# Patient Record
Sex: FEMALE | Employment: OTHER | URBAN - METROPOLITAN AREA
[De-identification: names, ages, dates, MRNs, and addresses within clinical notes are randomized per-mention and may not be internally consistent; named-entity substitution may affect disease eponyms.]

---

## 2019-03-21 ENCOUNTER — OFFICE VISIT (OUTPATIENT)
Dept: UROGYNECOLOGY | Facility: CLINIC | Age: 64
End: 2019-03-21
Payer: MEDICARE

## 2019-03-21 VITALS
DIASTOLIC BLOOD PRESSURE: 84 MMHG | SYSTOLIC BLOOD PRESSURE: 156 MMHG | BODY MASS INDEX: 26.74 KG/M2 | HEIGHT: 62 IN | WEIGHT: 145.31 LBS

## 2019-03-21 DIAGNOSIS — N95.2 VAGINAL ATROPHY: Primary | ICD-10-CM

## 2019-03-21 DIAGNOSIS — K58.9 IRRITABLE BOWEL SYNDROME, UNSPECIFIED TYPE: ICD-10-CM

## 2019-03-21 DIAGNOSIS — N39.46 URINARY INCONTINENCE, MIXED: ICD-10-CM

## 2019-03-21 DIAGNOSIS — N36.2 URETHRAL CARUNCLE: ICD-10-CM

## 2019-03-21 PROCEDURE — 99204 OFFICE O/P NEW MOD 45 MIN: CPT | Mod: 25,S$PBB,, | Performed by: OBSTETRICS & GYNECOLOGY

## 2019-03-21 PROCEDURE — 99999 PR PBB SHADOW E&M-NEW PATIENT-LVL III: ICD-10-PCS | Mod: PBBFAC,,, | Performed by: OBSTETRICS & GYNECOLOGY

## 2019-03-21 PROCEDURE — 99204 PR OFFICE/OUTPT VISIT, NEW, LEVL IV, 45-59 MIN: ICD-10-PCS | Mod: 25,S$PBB,, | Performed by: OBSTETRICS & GYNECOLOGY

## 2019-03-21 PROCEDURE — 99999 PR PBB SHADOW E&M-NEW PATIENT-LVL III: CPT | Mod: PBBFAC,,, | Performed by: OBSTETRICS & GYNECOLOGY

## 2019-03-21 PROCEDURE — 87086 URINE CULTURE/COLONY COUNT: CPT

## 2019-03-21 PROCEDURE — 51701 INSERT BLADDER CATHETER: CPT | Mod: PBBFAC | Performed by: OBSTETRICS & GYNECOLOGY

## 2019-03-21 PROCEDURE — 51701 PR INSERTION OF NON-INDWELLING BLADDER CATHETERIZATION FOR RESIDUAL UR: ICD-10-PCS | Mod: S$PBB,,, | Performed by: OBSTETRICS & GYNECOLOGY

## 2019-03-21 PROCEDURE — 51701 INSERT BLADDER CATHETER: CPT | Mod: S$PBB,,, | Performed by: OBSTETRICS & GYNECOLOGY

## 2019-03-21 PROCEDURE — 99203 OFFICE O/P NEW LOW 30 MIN: CPT | Mod: PBBFAC,25 | Performed by: OBSTETRICS & GYNECOLOGY

## 2019-03-21 RX ORDER — LISINOPRIL 10 MG/1
TABLET ORAL
COMMUNITY
End: 2020-07-22

## 2019-03-21 RX ORDER — ATORVASTATIN CALCIUM 40 MG/1
TABLET, FILM COATED ORAL
COMMUNITY
Start: 2019-01-20 | End: 2019-05-08 | Stop reason: SDUPTHER

## 2019-03-21 RX ORDER — ESTRADIOL 2 MG/1
SYSTEM VAGINAL
COMMUNITY
Start: 2019-02-19 | End: 2021-07-22 | Stop reason: SDUPTHER

## 2019-03-21 RX ORDER — METHYLPREDNISOLONE 4 MG/1
TABLET ORAL
COMMUNITY
Start: 2019-01-08 | End: 2019-07-18

## 2019-03-21 RX ORDER — ACETAMINOPHEN 500 MG
TABLET ORAL
COMMUNITY

## 2019-03-21 RX ORDER — NEBIVOLOL 5 MG/1
TABLET ORAL
COMMUNITY
End: 2019-07-18

## 2019-03-21 RX ORDER — ALBUTEROL SULFATE 90 UG/1
AEROSOL, METERED RESPIRATORY (INHALATION)
COMMUNITY

## 2019-03-21 RX ORDER — LISINOPRIL 10 MG/1
TABLET ORAL
COMMUNITY
Start: 2019-01-19 | End: 2020-07-22

## 2019-03-21 RX ORDER — ATORVASTATIN CALCIUM 40 MG/1
TABLET, FILM COATED ORAL
COMMUNITY

## 2019-03-21 RX ORDER — FLUTICASONE PROPIONATE 50 MCG
SPRAY, SUSPENSION (ML) NASAL
COMMUNITY
Start: 2019-02-26

## 2019-03-21 RX ORDER — BENZONATATE 200 MG/1
CAPSULE ORAL
COMMUNITY
Start: 2019-02-26

## 2019-03-21 RX ORDER — ALBUTEROL SULFATE 90 UG/1
AEROSOL, METERED RESPIRATORY (INHALATION)
COMMUNITY
Start: 2019-02-26 | End: 2019-05-08 | Stop reason: SDUPTHER

## 2019-03-21 RX ORDER — ASPIRIN 81 MG/1
TABLET ORAL
COMMUNITY

## 2019-03-21 RX ORDER — PANTOPRAZOLE SODIUM 40 MG/1
TABLET, DELAYED RELEASE ORAL
COMMUNITY
Start: 2019-03-01

## 2019-03-21 RX ORDER — CHLORDIAZEPOXIDE HYDROCHLORIDE AND CLIDINIUM BROMIDE 5; 2.5 MG/1; MG/1
CAPSULE ORAL
COMMUNITY
Start: 2019-02-15 | End: 2020-07-22

## 2019-03-21 RX ORDER — CETIRIZINE HYDROCHLORIDE 10 MG/1
TABLET ORAL
COMMUNITY

## 2019-03-21 RX ORDER — ESTRADIOL 0.1 MG/G
CREAM VAGINAL
Qty: 42.5 G | Refills: 11 | Status: SHIPPED | OUTPATIENT
Start: 2019-03-21

## 2019-03-21 RX ORDER — NEBIVOLOL HYDROCHLORIDE 5 MG/1
TABLET ORAL
COMMUNITY
Start: 2019-03-01

## 2019-03-21 RX ORDER — CEFPROZIL 250 MG/1
TABLET, FILM COATED ORAL
COMMUNITY
Start: 2018-12-19

## 2019-03-21 NOTE — LETTER
March 28, 2019      Kiana Alexandre MD  [address]  [place] [address]           [facility]  [address] [address]  Phone: [phone]          Patient: Andria Green   MR Number: [id number]   YOB: 1955   Date of Visit: 3/21/2019       Dear Dr. Kiana Alexandre:    Thank you for referring Andria Green to me for evaluation. Attached you will find relevant portions of my assessment and plan of care.    If you have questions, please do not hesitate to call me. I look forward to following Andria Green along with you.    Sincerely,    Marianne Smith MD    Enclosure  CC:  No Recipients    If you would like to receive this communication electronically, please contact [email or web address] or [phone] to request more information on [employer] access.    For providers and/or their staff who would like to refer a patient to [facility], please contact us through our one-stop-shop provider referral line, [facility], at [phone].    If you feel you have received this communication in error or would no longer like to receive these types of communications, please e-mail [email or web address]

## 2019-03-21 NOTE — PATIENT INSTRUCTIONS
Bladder Irritants  Certain foods and drinks have been associated with worsening symptoms of urinary frequency, urgency, urge incontinence, or bladder pain. If you suffer from any of these conditions, you may wish to try eliminating one or more of these foods from your diet and see if your symptoms improve. If bladder symptoms are related to dietary factors, strict adherence to a diet thateliminates the food should bring marked relief in 10 days. Once you are feeling better, you can begin to add foods back into your diet, one at a time. If symptoms return, you will be able to identify the irritant. As you add foods back to your diet it is very important that you drink significant amounts of water.    -----------------------------------------------------------------------------------------------  List of Common Bladder Irritants*  Alcoholic beverages  Apples and apple juice  Cantaloupe  Carbonated beverages  Chili and spicy foods  Chocolate  Citrus fruit  Coffee (including decaffeinated)  Cranberries and cranberry juice  Grapes  Guava  Milk Products: milk, cheese, cottage cheese, yogurt, ice cream  Peaches  Pineapple  Plums  Strawberries  Sugar especially artificial sweeteners, saccharin, aspartame, corn sweeteners, honey, fructose, sucrose, lactose  Tea  Tomatoes and tomato juice  Vitamin B complex  Vinegar  *Most people are not sensitive to ALL of these products; your goal is to find the foods that make YOUR symptoms worse.  ---------------------------------------------------------------------------------------------------    Low-acid fruit substitutions include apricots, papaya, pears and watermelon. Coffee drinkers can drink Kava or other lowacid instant drinks. Tea drinkers can substitute non-citrus herbal and sun brewed teas. Calcium carbonate co-buffered with calcium ascorbate can be substituted for Vitamin C. Prelief is a dietary supplement that works as an acid blocker for the bladder.    Where to get more  information:        Overcoming Bladder Disorders by Aline Castañeda and Joseph Sena, 1990        You Dont Have to Live with Cystitis! By Jeannie Man, 1988  · http://[email or web address].org/oab    ---------------------------------------------------------    1) Urethral caruncle:  --continues to have symptoms  --continue using estring  --try topical estrace again: dime-sized amount with finger just inside vagina, around opening/inner lips, and on red area at urethra every night.  Make sure to put on cream after urinating, right before bed.  Have intercourse before using cream.  Ask for generic or use goodRx if too expensive. Consider compounded estrogen cream.    --if continues to be refractory: would need biopsy, possible excision    2) Mixed urinary incontinence, urge > stress:    --urine C&S  --see if treating caruncle helps  --Empty bladder every 3 hours.  Empty well: wait a minute, lean forward on toilet.    --Avoid dietary irritants (see sheet).  Keep diary x 3-5 days to determine your irritants.  --KEGELS: do 10 in AM and 10 in PM, holding each x 10 seconds.  When you feel urge to go, STOP, KEGEL, and when urge has passed, then go to bathroom.  Consider PT in future.    --URGE: consider medication in future.  Takes 2-4 weeks to see if will have effect.  For dry mouth: get sour, sugar free lozenge or gum.    --STRESS:  Pessary vs. Sling.     3)  H/o irritable bowel:  --controlling irregular bowel movements may help bladder symptoms  --follow up with Dr. Pierre in [place]  --continue librax and essential oils  --continue to avoid dietary irritants  --take probiotic daily  --Start daily fiber.  Take 1 tsp of fiber powder (psyllium or other sugar-free powder).  Mix in 8 oz of water.  Take x 3-5 days.  Then, increase fiber by 1 tsp every 3-5 days until stool is easy to pass, well-formed, less erratic.  Stop and continue at that dose.   Do not exceed 6 tsps/day.  May  also use over the counter stool softener 1-2 x/day.  AVOID laxatives.    4)  RTC 6 weeks.

## 2019-03-21 NOTE — PROGRESS NOTES
[facility]  [address] [address]    Andria Green  [id number]  1955 March 28, 2019    Consulting Physician: Kiana Alexandre, *   GYN: Bettie Villa M.D.: Primary Doctor No    Chief Complaint   Patient presents with    Consult     urethral caruncle       HPI:     1)  UI:  (+) JOSE J (rare) < (+) UUI (intermittent)  X 5years.  (+) pads:2/day, usually minimum wetness and no pad use at night.  Daytime frequency: Q 2 hours.  Nocturia: No:    (--) dysuria,  (--) hematuria,  (--) frequent UTIs.  (+) complete bladder emptying.      2)  POP:  Absent.  No consistent vaginal bleeding since using the Estring but has minor spotting x1 a month . (--) vaginal discharge. (--) sexually active.  (+) dyspareunia since the caruncle has been present. (--)  Vaginal dryness.  (+) vaginal estrogen use (Estring).  Did try to use estrace cream x awhile--didn't feel like it was helping. Last use was 1-2 months ago. Estrogen cream was expensive.      3)  BM:  (+) constipation/straining.  (+) chronic diarrhea described as up to 2 episodes a day. Irritable bowel--mostly loose. Sees Dr. Pierre in [place].  Has tried probiotic--only intermittently. Hasn't tried fiber. Did FODMAP diet--notes sweets trigger. Drinks lactaid mild.  (--) hematochezia.  (--) fecal incontinence.  (+) fecal smearing/urgency.  (+) complete evacuation.  Pt says at least 3-4x a month.    4)  Urethral caruncle:  Urethral caruncle was picked up by PCP, Dr. Lantigua during her yearly pelvic exam 6 years ago.  She was having no dysuria, bleeding or pain at the time it was found. Was sent to Urogynecologist Dr. Barry who did a bladder emptying study which was reported as normal.  Was instructed to use estrace for the caruncle.  Had been having dyspareunia with the urethral caruncle since then.  She was instructed at her most recent PCP visit on 2/1/2019 to be consulted by another urogynecologist.  Did try to use  estrace cream x awhile--didn't feel like it was helping.     Past Medical History  Past Medical History:   Diagnosis Date    Asthma     Hyperlipidemia     Hypertension     IBS (irritable bowel syndrome)     Hiatal Hernia  Allergic rhinitis    Past Surgical History  Past Surgical History:   Procedure Laterality Date    NASAL ENDOSCOPY W/ BALLON SINUPLASTY          Hysterectomy: No    Past Ob History     x 2     Largest infant weight: 7lbs 14oz  no FAVD. yes episiotomy.      Gynecologic History  LMP: No LMP recorded. Patient is postmenopausal.  Age of menarche: 10  Age of menopause: Unsure of date  Menstrual history: Irregular menstrual cycles with heavy menstrual bleeding and painful menstrual cramping. She is unsure of the exact length of her cycles  Pap test: Normal  History of abnormal paps: No.  History of STIs:  No  Mammogram: Date of last: 2019.  Result: Normal  Colonoscopy: Date of last: .  Result:  Normal.  Repeat due: every 5 years.    DEXA:  Date of last: 7-8 years ago.  Result:  Normal.  Repeat due:  Now.     Family History  Family History   Problem Relation Age of Onset    Breast cancer Sister     Cancer Sister         colon CA      Colon CA: Yes - Sister at age 52 ( when she was 53)  Breast CA: Yes - Sister in her 30s  GYN CA: Yes - Cervical cancer in sister in her late 20s   CA: No    Social History  Social History     Tobacco Use   Smoking Status Never Smoker   .  No smoking    Social History     Substance and Sexual Activity   Alcohol Use Yes   .  Social alcohol use  Social History     Substance and Sexual Activity   Drug Use No   .  The patient is .  Resides in [place] [address].  Employment status: retired.    She was Human resources.    Allergies  Review of patient's allergies indicates:  No Known Allergies    Medications  Current Outpatient Medications on File Prior to Visit   Medication Sig Dispense Refill    albuterol (PROVENTIL/VENTOLIN HFA) 90  mcg/actuation inhaler       aspirin (ADULT LOW DOSE ASPIRIN) 81 MG EC tablet       atorvastatin (LIPITOR) 40 MG tablet       BYSTOLIC 5 mg Tab       cetirizine (ZYRTEC) 10 MG tablet       chlordiazepoxide-clidinium 5-2.5 mg (LIBRAX) 5-2.5 mg Cap       cholecalciferol, vitamin D3, (VITAMIN D3) 2,000 unit Cap       estradiol (ESTRING) 2 mg (7.5 mcg /24 hour) vaginal ring       fluticasone (FLONASE) 50 mcg/actuation nasal spray       lisinopril 10 MG tablet       nebivolol (BYSTOLIC) 5 MG Tab       omega 3-dha-epa-fish oil (FISH OIL) 360-1,200 mg CpDR       pantoprazole (PROTONIX) 40 MG tablet       albuterol (PROVENTIL/VENTOLIN HFA) 90 mcg/actuation inhaler       atorvastatin (LIPITOR) 40 MG tablet       benzonatate (TESSALON) 200 MG capsule       cefPROZIL (CEFZIL) 250 MG tablet       ESTRING 2 mg (7.5 mcg /24 hour) vaginal ring       lisinopril 10 MG tablet       methylPREDNISolone (MEDROL DOSEPACK) 4 mg tablet        No current facility-administered medications on file prior to visit.      Albuterol inhaler entered twice.  Was given medrol dose pack and Cefprozil for bronchitis in December 2018 but is no longer them.  Not using tessalon either (given for bronchitis)    ? Medrol dose pack; Cefprozil  Review of Systems A 14 point ROS was reviewed with pertinent positives as noted above in the history of present illness.      Constitutional: negative  Eyes: negative  Endocrine: negative  Gastrointestinal: negative  Cardiovascular: negative  Respiratory: shortness of breath with allergic reactions  Allergic/Immunologic: allergic rhinitis to environmental allergens  Integumentary: negative  Psychiatric: negative  Musculoskeletal: negative   Ear/Nose/Throat: negative  Neurologic: negative   Genitourinary: SEE HPI  Hematologic/Lymphatic: negative   Breast: Hx of fibrocystic changes in both breasts    Urogynecologic Exam  BP (!) 156/84 (BP Location: Left arm, Patient Position: Sitting, BP Method: Medium  "(Manual))   Ht 5' 2" (1.575 m)   Wt 65.9 kg (145 lb 4.5 oz)   BMI 26.57 kg/m²     GENERAL APPEARANCE:  The patient is well-developed, well-nourished.   Neck:  Supple with no thyromegaly, no carotid bruits.  Heart:  Regular rate and rhythm, no murmurs, rubs or gallops.  Lungs:  Clear.  No CVA tenderness.  Abdomen:  Soft, nontender, nondistended, no hepatosplenomegaly.  Incisions:  none    PELVIC:    External genitalia:  Normal Bartholins, Skenes and labia bilaterally.    Urethra:  + caruncle, red with point TTP, not circumferential; no diverticulum or masses.  (+) hypermobility.    Vagina:  Atrophy (+) , no bladder masses or tender, no discharge.  Estring in place.   Cervix:  normal appearance  Uterus: normal size, contour, position, consistency, mobility, non-tender  Adnexa: Not palpable.      Deferred.  No obvious POP present with valsalva.     NEUROLOGIC:  Cranial nerves 2 through 12 intact.  Strength 5/5.  DTRs 2+ lower extremities.  S2 through 4 normal.  Sacral reflexes intact.    EXT: WINSTON, 2+ pulses bilaterally, no C/C/E    COUGH STRESS TEST:  negative  KEGEL: 1 /5    RECTAL:    External:  Normal, (--) hemorrhoids, (--) dovetailing.   Internal:  deferred    PVR: 30 mL (topical lidocaine used for comfort; no major pain with CIC)          Impression    1. Vaginal atrophy    2. Urethral caruncle    3. Urinary incontinence, mixed    4. Irritable bowel syndrome, unspecified type        Initial Plan  The patient was counseled regarding these issues. The patient was given a summary sheet containing each of these issues with possible options for evaluation and management. When appropriate, we also reviewed computer-generated diagrams specific to their diagnoses..  All questions were addressed to the patient's satisfaction.    1) Urethral caruncle:  --continues to have symptoms  --continue using estring  --try topical estrace again: dime-sized amount with finger just inside vagina, around opening/inner lips, and on " red area at urethra every night.  Make sure to put on cream after urinating, right before bed.  Have intercourse before using cream.  Ask for generic or use goodRx if too expensive. Consider compounded estrogen cream.    --if continues to be refractory: would need biopsy, possible excision    2) Mixed urinary incontinence, urge > stress:    --urine C&S  --see if treating caruncle helps  --Empty bladder every 3 hours.  Empty well: wait a minute, lean forward on toilet.    --Avoid dietary irritants (see sheet).  Keep diary x 3-5 days to determine your irritants.  --KEGELS: do 10 in AM and 10 in PM, holding each x 10 seconds.  When you feel urge to go, STOP, KEGEL, and when urge has passed, then go to bathroom.  Consider PT in future.    --URGE: consider medication in future.  Takes 2-4 weeks to see if will have effect.  For dry mouth: get sour, sugar free lozenge or gum.    --STRESS:  Pessary vs. Sling.     3)  H/o irritable bowel:  --controlling irregular bowel movements may help bladder symptoms  --follow up with Dr. Pierre in [place]  --continue librax and essential oils  --continue to avoid dietary irritants  --take probiotic daily  --Start daily fiber.  Take 1 tsp of fiber powder (psyllium or other sugar-free powder).  Mix in 8 oz of water.  Take x 3-5 days.  Then, increase fiber by 1 tsp every 3-5 days until stool is easy to pass, well-formed, less erratic.  Stop and continue at that dose.   Do not exceed 6 tsps/day.  May also use over the counter stool softener 1-2 x/day.  AVOID laxatives.    4)  RTC 6 weeks.     Approximately 50 min were spent in consult, 90 % in discussion.     Thank you for requesting consultation of your patient.  I look forward to participating in their care.    Marianne Smith  Female Pelvic Medicine and Reconstructive Surgery  [facility] [place], [place]

## 2019-03-23 LAB — BACTERIA UR CULT: NO GROWTH

## 2019-03-25 ENCOUNTER — TELEPHONE (OUTPATIENT)
Dept: UROGYNECOLOGY | Facility: CLINIC | Age: 64
End: 2019-03-25

## 2019-03-25 NOTE — TELEPHONE ENCOUNTER
Spoke with pt and relayed message that urine culture was negative for infection, pt voiced understanding and call was ended.

## 2019-03-25 NOTE — TELEPHONE ENCOUNTER
----- Message from Marianne Smith MD sent at 3/24/2019  2:41 PM CDT -----  Please let the patient know urine C&S was negative for infection.  Thanks!

## 2019-03-28 PROBLEM — K58.9 IRRITABLE BOWEL SYNDROME: Status: ACTIVE | Noted: 2019-03-28

## 2019-03-28 PROBLEM — N39.46 URINARY INCONTINENCE, MIXED: Status: ACTIVE | Noted: 2019-03-28

## 2019-03-28 PROBLEM — N36.2 URETHRAL CARUNCLE: Status: ACTIVE | Noted: 2019-03-28

## 2019-03-28 PROBLEM — N95.2 VAGINAL ATROPHY: Status: ACTIVE | Noted: 2019-03-28

## 2019-05-08 ENCOUNTER — OFFICE VISIT (OUTPATIENT)
Dept: UROGYNECOLOGY | Facility: CLINIC | Age: 64
End: 2019-05-08
Payer: MEDICARE

## 2019-05-08 VITALS
DIASTOLIC BLOOD PRESSURE: 86 MMHG | BODY MASS INDEX: 26.49 KG/M2 | HEIGHT: 62 IN | WEIGHT: 143.94 LBS | SYSTOLIC BLOOD PRESSURE: 152 MMHG

## 2019-05-08 DIAGNOSIS — N39.46 URINARY INCONTINENCE, MIXED: ICD-10-CM

## 2019-05-08 DIAGNOSIS — K58.9 IRRITABLE BOWEL SYNDROME, UNSPECIFIED TYPE: ICD-10-CM

## 2019-05-08 DIAGNOSIS — N36.2 URETHRAL CARUNCLE: Primary | ICD-10-CM

## 2019-05-08 DIAGNOSIS — N95.2 VAGINAL ATROPHY: ICD-10-CM

## 2019-05-08 PROCEDURE — 99213 PR OFFICE/OUTPT VISIT, EST, LEVL III, 20-29 MIN: ICD-10-PCS | Mod: S$PBB,,, | Performed by: OBSTETRICS & GYNECOLOGY

## 2019-05-08 PROCEDURE — 99999 PR PBB SHADOW E&M-EST. PATIENT-LVL III: ICD-10-PCS | Mod: PBBFAC,,, | Performed by: OBSTETRICS & GYNECOLOGY

## 2019-05-08 PROCEDURE — 99999 PR PBB SHADOW E&M-EST. PATIENT-LVL III: CPT | Mod: PBBFAC,,, | Performed by: OBSTETRICS & GYNECOLOGY

## 2019-05-08 PROCEDURE — 99213 OFFICE O/P EST LOW 20 MIN: CPT | Mod: PBBFAC | Performed by: OBSTETRICS & GYNECOLOGY

## 2019-05-08 PROCEDURE — 99213 OFFICE O/P EST LOW 20 MIN: CPT | Mod: S$PBB,,, | Performed by: OBSTETRICS & GYNECOLOGY

## 2019-05-08 NOTE — PATIENT INSTRUCTIONS
1) Urethral caruncle:  --continues to have symptoms  --continue using estring  --continue topical estrace again: dime-sized amount with finger just inside vagina, around opening/inner lips, and on red area at urethra every night.  Make sure to put on cream after urinating, right before bed.  Have intercourse before using cream.  Ask for generic or use goodRx if too expensive. Consider compounded estrogen cream.    --if continues to be refractory: would need biopsy, possible excision     2) Mixed urinary incontinence, urge > stress:    --urine C&S  --see if treating caruncle helps  --Empty bladder every 3 hours.  Empty well: wait a minute, lean forward on toilet.    --Avoid dietary irritants (see sheet).  Keep diary x 3-5 days to determine your irritants.  --KEGELS: do 10 in AM and 10 in PM, holding each x 10 seconds.  When you feel urge to go, STOP, KEGEL, and when urge has passed, then go to bathroom.  Consider PT in future.    --URGE: consider medication in future.  Takes 2-4 weeks to see if will have effect.  For dry mouth: get sour, sugar free lozenge or gum.    --STRESS:  Pessary vs. Sling.      3)  H/o irritable bowel:  --controlling irregular bowel movements may help bladder symptoms  --follow up with Dr. Pierre in [place]  --continue librax and essential oils  --continue to avoid dietary irritants  --take probiotic daily  --continue fiber pills daily     4)  RTC 2 months. If not resolved, consider cystoscopy/possible Bx if symptomatic.

## 2019-05-08 NOTE — PROGRESS NOTES
Urogyn follow up  05/08/2019  .  [facility] 4   [address] [address]    Andria Green  [id number]  1955      Andria Green is a 64 y.o.  here for a urogyn follow up for urethral caruncle    Last HPI from 03/21/52019  1)  UI:  (+) JOSE J (rare) < (+) UUI (intermittent)  X 5years.  (+) pads:2/day, usually minimum wetness and no pad use at night.  Daytime frequency: Q 2 hours.  Nocturia: No:    (--) dysuria,  (--) hematuria,  (--) frequent UTIs.  (+) complete bladder emptying.       2)  POP:  Absent.  No consistent vaginal bleeding since using the Estring but has minor spotting x1 a month . (--) vaginal discharge. (--) sexually active.  (+) dyspareunia since the caruncle has been present. (--)  Vaginal dryness.  (+) vaginal estrogen use (Estring).  Did try to use estrace cream x awhile--didn't feel like it was helping. Last use was 1-2 months ago. Estrogen cream was expensive.       3)  BM:  (+) constipation/straining.  (+) chronic diarrhea described as up to 2 episodes a day. Irritable bowel--mostly loose. Sees Dr. Pierre in [place].  Has tried probiotic--only intermittently. Hasn't tried fiber. Did FODMAP diet--notes sweets trigger. Drinks lactaid mild.  (--) hematochezia.  (--) fecal incontinence.  (+) fecal smearing/urgency.  (+) complete evacuation.  Pt says at least 3-4x a month.     4)  Urethral caruncle:  Urethral caruncle was picked up by PCP, Dr. Lantigua during her yearly pelvic exam 6 years ago.  She was having no dysuria, bleeding or pain at the time it was found. Was sent to Urogynecologist Dr. Barry who did a bladder emptying study which was reported as normal.  Was instructed to use estrace for the caruncle.  Had been having dyspareunia with the urethral caruncle since then.  She was instructed at her most recent PCP visit on 2/1/2019 to be consulted by another urogynecologist.  Did try to use estrace cream x awhile--didn't feel like it was helping.      --initial exam:  PELVIC:    External genitalia:  Normal Bartholins, Skenes and labia bilaterally.    Urethra:  + caruncle, red with point TTP, not circumferential; no diverticulum or masses.  (+) hypermobility.    Vagina:  Atrophy (+) , no bladder masses or tender, no discharge.  Estring in place.   Cervix:  normal appearance  Uterus: normal size, contour, position, consistency, mobility, non-tender  Adnexa: Not palpable.    Changes from last visit:  1) Urethral caruncle:  --using estrace cream  --still feels tender in the area but better than last visit     2) Mixed urinary incontinence, urge > stress:    --denies UI  --rare urge in early AM  --only drinking one cup of coffee a day. Stopped diet cokes  --voiding every 2 1/2 - 3 hours   --denies nocturia    3)  H/o irritable bowel:  --taking fiber capsules 2 daily  --taking probiotic M/W/F      Past Medical History:   Diagnosis Date    Asthma     Hyperlipidemia     Hypertension     IBS (irritable bowel syndrome)        Past Surgical History:   Procedure Laterality Date    NASAL ENDOSCOPY W/ BALLON SINUPLASTY         Current Outpatient Medications   Medication Sig    albuterol (PROVENTIL/VENTOLIN HFA) 90 mcg/actuation inhaler     aspirin (ADULT LOW DOSE ASPIRIN) 81 MG EC tablet     atorvastatin (LIPITOR) 40 MG tablet     benzonatate (TESSALON) 200 MG capsule     BYSTOLIC 5 mg Tab     cefPROZIL (CEFZIL) 250 MG tablet     cetirizine (ZYRTEC) 10 MG tablet     chlordiazepoxide-clidinium 5-2.5 mg (LIBRAX) 5-2.5 mg Cap     cholecalciferol, vitamin D3, (VITAMIN D3) 2,000 unit Cap     estradiol (ESTRACE) 0.01 % (0.1 mg/gram) vaginal cream dime-sized amount with finger just inside vagina, around opening/inner lips, and on red area at urethra every night.    estradiol (ESTRING) 2 mg (7.5 mcg /24 hour) vaginal ring     fluticasone (FLONASE) 50 mcg/actuation nasal spray     lisinopril 10 MG tablet     nebivolol (BYSTOLIC) 5 MG Tab     omega 3-dha-epa-fish  "oil (FISH OIL) 360-1,200 mg CpDR     pantoprazole (PROTONIX) 40 MG tablet     ESTRING 2 mg (7.5 mcg /24 hour) vaginal ring     lisinopril 10 MG tablet     methylPREDNISolone (MEDROL DOSEPACK) 4 mg tablet      No current facility-administered medications for this visit.        Well woman:  Pap test: Normal  History of abnormal paps: No.  History of STIs:  No  Mammogram: Date of last: February 2019.  Result: Normal  Colonoscopy: Date of last: 2014.  Result:  Normal.  Repeat due: every 5 years.    DEXA:  Date of last: 7-8 years ago.  Result:  Normal.  Repeat due:  Now.       ROS:  As per HPI.      Exam  BP (!) 152/86 (BP Location: Right arm, Patient Position: Sitting, BP Method: Medium (Manual))   Ht 5' 2" (1.575 m)   Wt 65.3 kg (143 lb 15.4 oz)   BMI 26.33 kg/m²   General: alert and oriented, no acute distress  Respiratory: normal respiratory effort  Abd: soft, non-tender, non-distended    PELVIC:    External genitalia:  Normal Bartholins, Skenes and labia bilaterally.    Urethra:  + caruncle, red with point TTP--much improved from last visit, not circumferential; no diverticulum or masses.  (+) hypermobility.    Vagina:  Atrophy (+) , no bladder masses or tender, no discharge.  Estring in place.   Cervix:  normal appearance  Uterus: normal size, contour, position, consistency, mobility, non-tender  Adnexa: Not palpable.    Impression  1. Urethral caruncle     2. Urinary incontinence, mixed     3. Vaginal atrophy     4. Irritable bowel syndrome, unspecified type       We reviewed the above issues and discussed options for short-term versus long-term management of her problems.   Plan:   1) Urethral caruncle:  --continues to have symptoms  --continue using estring  --continue topical estrace again: dime-sized amount with finger just inside vagina, around opening/inner lips, and on red area at urethra every night.  Make sure to put on cream after urinating, right before bed.  Have intercourse before using cream.  " Ask for generic or use goodRx if too expensive. Consider compounded estrogen cream.    --if continues to be refractory: would need biopsy, possible excision     2) Mixed urinary incontinence, urge > stress:    --urine C&S  --see if treating caruncle helps  --Empty bladder every 3 hours.  Empty well: wait a minute, lean forward on toilet.    --Avoid dietary irritants (see sheet).  Keep diary x 3-5 days to determine your irritants.  --KEGELS: do 10 in AM and 10 in PM, holding each x 10 seconds.  When you feel urge to go, STOP, KEGEL, and when urge has passed, then go to bathroom.  Consider PT in future.    --URGE: consider medication in future.  Takes 2-4 weeks to see if will have effect.  For dry mouth: get sour, sugar free lozenge or gum.    --STRESS:  Pessary vs. Sling.      3)  H/o irritable bowel:  --controlling irregular bowel movements may help bladder symptoms  --follow up with Dr. Pierre in [place]  --continue librax and essential oils  --continue to avoid dietary irritants  --take probiotic daily  --continue fiber pills daily     4)  RTC 2 months. If not resolved, consider cystoscopy/possible Bx vs excision if symptomatic.     30 minutes were spent in face to face time with this patient  90 % of this time was spent in counseling and/or coordination of care  ME@  [facility]  Division of Female Pelvic Medicine and Reconstructive Surgery  Department of Obstetrics & Gynecology

## 2019-07-18 ENCOUNTER — OFFICE VISIT (OUTPATIENT)
Dept: UROGYNECOLOGY | Facility: CLINIC | Age: 64
End: 2019-07-18
Payer: MEDICARE

## 2019-07-18 DIAGNOSIS — N39.46 URINARY INCONTINENCE, MIXED: ICD-10-CM

## 2019-07-18 DIAGNOSIS — N95.2 VAGINAL ATROPHY: ICD-10-CM

## 2019-07-18 DIAGNOSIS — K58.9 IRRITABLE BOWEL SYNDROME, UNSPECIFIED TYPE: ICD-10-CM

## 2019-07-18 DIAGNOSIS — N36.2 URETHRAL CARUNCLE: Primary | ICD-10-CM

## 2019-07-18 PROCEDURE — 99213 OFFICE O/P EST LOW 20 MIN: CPT | Mod: S$PBB,,, | Performed by: OBSTETRICS & GYNECOLOGY

## 2019-07-18 PROCEDURE — 99999 PR PBB SHADOW E&M-EST. PATIENT-LVL III: ICD-10-PCS | Mod: PBBFAC,,, | Performed by: OBSTETRICS & GYNECOLOGY

## 2019-07-18 PROCEDURE — 99999 PR PBB SHADOW E&M-EST. PATIENT-LVL III: CPT | Mod: PBBFAC,,, | Performed by: OBSTETRICS & GYNECOLOGY

## 2019-07-18 PROCEDURE — 99213 PR OFFICE/OUTPT VISIT, EST, LEVL III, 20-29 MIN: ICD-10-PCS | Mod: S$PBB,,, | Performed by: OBSTETRICS & GYNECOLOGY

## 2019-07-18 PROCEDURE — 99213 OFFICE O/P EST LOW 20 MIN: CPT | Mod: PBBFAC | Performed by: OBSTETRICS & GYNECOLOGY

## 2019-07-18 NOTE — PATIENT INSTRUCTIONS
1) Urethral caruncle:  --symptoms are minimal  --stable--CTM  --continue using estring  --at least 2-3 times a week continue topical estrace again: dime-sized amount with finger just inside vagina, around opening/inner lips, and on red area at urethra every night.  Make sure to put on cream after urinating, right before bed.  Have intercourse before using cream.  Ask for generic or use goodRx if too expensive. Consider compounded estrogen cream.    --if continues to be refractory: would need biopsy, possible excision     2) Mixed urinary incontinence, urge > stress:    --urine C&S  --see if treating caruncle helps  --Empty bladder every 3 hours.  Empty well: wait a minute, lean forward on toilet.    --Avoid dietary irritants (see sheet).  Keep diary x 3-5 days to determine your irritants.  --KEGELS: do 10 in AM and 10 in PM, holding each x 10 seconds.  When you feel urge to go, STOP, KEGEL, and when urge has passed, then go to bathroom.  Consider PT in future.    --URGE: consider medication in future.  Takes 2-4 weeks to see if will have effect.  For dry mouth: get sour, sugar free lozenge or gum.    --STRESS:  Pessary vs. Sling.      3)  H/o irritable bowel:  --controlling irregular bowel movements may help bladder symptoms  --follow up with Dr. Pierre in [place]  --continue librax and essential oils  --continue to avoid dietary irritants  --take probiotic daily  --continue fiber pills daily     4)  RTC 6 months.  If have more acute pain or bleeding, let us know sooner. If worsens, consider cystoscopy/possible Bx vs excision if symptomatic.

## 2019-07-18 NOTE — PROGRESS NOTES
Urogyn follow up  05/08/2019  .  [facility]   [address] [address]    Andria Green  [id number]  1955      Andira Green is a 64 y.o.  here for a urogyn follow up for urethral caruncle    Last HPI from 03/21/52019  1)  UI:  (+) JOSE J (rare) < (+) UUI (intermittent)  X 5years.  (+) pads:2/day, usually minimum wetness and no pad use at night.  Daytime frequency: Q 2 hours.  Nocturia: No:    (--) dysuria,  (--) hematuria,  (--) frequent UTIs.  (+) complete bladder emptying.       2)  POP:  Absent.  No consistent vaginal bleeding since using the Estring but has minor spotting x1 a month . (--) vaginal discharge. (--) sexually active.  (+) dyspareunia since the caruncle has been present. (--)  Vaginal dryness.  (+) vaginal estrogen use (Estring).  Did try to use estrace cream x awhile--didn't feel like it was helping. Last use was 1-2 months ago. Estrogen cream was expensive.       3)  BM:  (+) constipation/straining.  (+) chronic diarrhea described as up to 2 episodes a day. Irritable bowel--mostly loose. Sees Dr. Pierre in [place].  Has tried probiotic--only intermittently. Hasn't tried fiber. Did FODMAP diet--notes sweets trigger. Drinks lactaid mild.  (--) hematochezia.  (--) fecal incontinence.  (+) fecal smearing/urgency.  (+) complete evacuation.  Pt says at least 3-4x a month.     4)  Urethral caruncle:  Urethral caruncle was picked up by PCP, Dr. Lantigua during her yearly pelvic exam 6 years ago.  She was having no dysuria, bleeding or pain at the time it was found. Was sent to Urogynecologist Dr. Barry who did a bladder emptying study which was reported as normal.  Was instructed to use estrace for the caruncle.  Had been having dyspareunia with the urethral caruncle since then.  She was instructed at her most recent PCP visit on 2/1/2019 to be consulted by another urogynecologist.  Did try to use estrace cream x awhile--didn't feel like it was helping.      --initial exam:  PELVIC:    External genitalia:  Normal Bartholins, Skenes and labia bilaterally.    Urethra:  + caruncle, red with point TTP, not circumferential; no diverticulum or masses.  (+) hypermobility.    Vagina:  Atrophy (+) , no bladder masses or tender, no discharge.  Estring in place.   Cervix:  normal appearance  Uterus: normal size, contour, position, consistency, mobility, non-tender  Adnexa: Not palpable.    Changes from last visit:  1) Urethral caruncle:  --no VB  --not as painful  --using estrace to caruncle and estring  --not sure that it is any smaller.     2) Mixed urinary incontinence, urge > stress:    --has improved; not using pads  --voiding every 2-3 hours  --denies nocturia     3)  H/o irritable bowel:  --improved with fiber.  Not using probiotic every day.   --followed by Dr. Pierre in [place]  --still using librax and essential oils    Past Medical History:   Diagnosis Date    Asthma     Hyperlipidemia     Hypertension     IBS (irritable bowel syndrome)        Past Surgical History:   Procedure Laterality Date    NASAL ENDOSCOPY W/ BALLON SINUPLASTY         Current Outpatient Medications   Medication Sig    albuterol (PROVENTIL/VENTOLIN HFA) 90 mcg/actuation inhaler     aspirin (ADULT LOW DOSE ASPIRIN) 81 MG EC tablet     atorvastatin (LIPITOR) 40 MG tablet     benzonatate (TESSALON) 200 MG capsule     BYSTOLIC 5 mg Tab     cetirizine (ZYRTEC) 10 MG tablet     chlordiazepoxide-clidinium 5-2.5 mg (LIBRAX) 5-2.5 mg Cap     cholecalciferol, vitamin D3, (VITAMIN D3) 2,000 unit Cap     estradiol (ESTRACE) 0.01 % (0.1 mg/gram) vaginal cream dime-sized amount with finger just inside vagina, around opening/inner lips, and on red area at urethra every night.    estradiol (ESTRING) 2 mg (7.5 mcg /24 hour) vaginal ring     fluticasone (FLONASE) 50 mcg/actuation nasal spray     lisinopril 10 MG tablet     omega 3-dha-epa-fish oil (FISH OIL) 360-1,200 mg CpDR      pantoprazole (PROTONIX) 40 MG tablet     cefPROZIL (CEFZIL) 250 MG tablet     ESTRING 2 mg (7.5 mcg /24 hour) vaginal ring     lisinopril 10 MG tablet      No current facility-administered medications for this visit.        Well woman:  Pap test: Normal  History of abnormal paps: No.  History of STIs:  No  Mammogram: Date of last: February 2019.  Result: Normal  Colonoscopy: Date of last: 2014.  Result:  Normal.  Repeat due: every 5 years.    DEXA:  Date of last: 7-8 years ago.  Result:  Normal.  Repeat due:  Now.       ROS:  As per HPI.      Exam  There were no vitals taken for this visit.  General: alert and oriented, no acute distress  Respiratory: normal respiratory effort  Abd: soft, non-tender, non-distended    PELVIC:    External genitalia:  Normal Bartholins, Skenes and labia bilaterally.    Urethra:  + caruncle, min TTP--much improved from last visit, not circumferential; no diverticulum or masses. Stable in size.  No firmness or TTP palpated along the length of the urethra.  (+) hypermobility.    Vagina:  Atrophy (+) , no bladder masses or tender, no discharge.  Estring in place.   Cervix:  normal appearance  Uterus: normal size, contour, position, consistency, mobility, non-tender  Adnexa: Not palpable.    Impression  1. Urethral caruncle     2. Urinary incontinence, mixed     3. Vaginal atrophy     4. Irritable bowel syndrome, unspecified type       We reviewed the above issues and discussed options for short-term versus long-term management of her problems.   Plan:   1) Urethral caruncle:  --symptoms are minimal  --stable--CTM  --continue using estring  --at least 2-3 times a week continue topical estrace again: dime-sized amount with finger just inside vagina, around opening/inner lips, and on red area at urethra every night.  Make sure to put on cream after urinating, right before bed.  Have intercourse before using cream.  Ask for generic or use goodRx if too expensive. Consider compounded  estrogen cream.    --if continues to be refractory: would need biopsy, possible excision     2) Mixed urinary incontinence, urge > stress:    --urine C&S  --see if treating caruncle helps  --Empty bladder every 3 hours.  Empty well: wait a minute, lean forward on toilet.    --Avoid dietary irritants (see sheet).  Keep diary x 3-5 days to determine your irritants.  --KEGELS: do 10 in AM and 10 in PM, holding each x 10 seconds.  When you feel urge to go, STOP, KEGEL, and when urge has passed, then go to bathroom.  Consider PT in future.    --URGE: consider medication in future.  Takes 2-4 weeks to see if will have effect.  For dry mouth: get sour, sugar free lozenge or gum.    --STRESS:  Pessary vs. Sling.      3)  H/o irritable bowel:  --controlling irregular bowel movements may help bladder symptoms  --follow up with Dr. Pierre in [place]  --continue librax and essential oils  --continue to avoid dietary irritants  --take probiotic daily  --continue fiber pills daily     4)  RTC 6 months.  If have more acute pain or bleeding, let us know sooner. If worsens, consider cystoscopy/possible Bx vs excision if symptomatic.      30 minutes were spent in face to face time with this patient  90 % of this time was spent in counseling and/or coordination of care  ME@  [facility]  Division of Female Pelvic Medicine and Reconstructive Surgery  Department of Obstetrics & Gynecology

## 2020-02-17 ENCOUNTER — PATIENT MESSAGE (OUTPATIENT)
Dept: UROGYNECOLOGY | Facility: CLINIC | Age: 65
End: 2020-02-17

## 2020-03-27 ENCOUNTER — TELEPHONE (OUTPATIENT)
Dept: UROGYNECOLOGY | Facility: CLINIC | Age: 65
End: 2020-03-27

## 2020-03-27 DIAGNOSIS — N95.2 VAGINAL ATROPHY: Primary | ICD-10-CM

## 2020-03-27 NOTE — TELEPHONE ENCOUNTER
----- Message from Marianne Smith MD sent at 3/27/2020  2:00 PM CDT -----  Regarding: RE: RX REFILL-E STRING  Estring refilled until appt can be rescheduled. Please let patient know. Thanks!  ----- Message -----  From: Nieves Roney  Sent: 3/27/2020   1:39 PM CDT  To: Marianne Smith MD  Subject: RX REFILL-E STRING                               Was talking with the patient about rescheduling and she mentioned that she is near the end of her 3 month for her prescription (E-String)??  I told her that I would let you know about this.  Thanks.

## 2020-03-27 NOTE — TELEPHONE ENCOUNTER
----- Message from Nieves Sim sent at 3/27/2020  1:39 PM CDT -----  Regarding: RX REFILL-E STRING  Was talking with the patient about rescheduling and she mentioned that she is near the end of her 3 month for her prescription (E-String)??  I told her that I would let you know about this.  Thanks.

## 2020-07-22 ENCOUNTER — OFFICE VISIT (OUTPATIENT)
Dept: UROGYNECOLOGY | Facility: CLINIC | Age: 65
End: 2020-07-22
Payer: MEDICARE

## 2020-07-22 VITALS
BODY MASS INDEX: 26.61 KG/M2 | SYSTOLIC BLOOD PRESSURE: 140 MMHG | WEIGHT: 144.63 LBS | DIASTOLIC BLOOD PRESSURE: 76 MMHG | HEIGHT: 62 IN

## 2020-07-22 DIAGNOSIS — N95.2 VAGINAL ATROPHY: ICD-10-CM

## 2020-07-22 DIAGNOSIS — Z12.4 CERVICAL CANCER SCREENING: ICD-10-CM

## 2020-07-22 DIAGNOSIS — N36.2 URETHRAL CARUNCLE: ICD-10-CM

## 2020-07-22 DIAGNOSIS — Z12.31 ENCOUNTER FOR SCREENING MAMMOGRAM FOR BREAST CANCER: Primary | ICD-10-CM

## 2020-07-22 DIAGNOSIS — K58.9 IRRITABLE BOWEL SYNDROME, UNSPECIFIED TYPE: ICD-10-CM

## 2020-07-22 DIAGNOSIS — N39.46 URINARY INCONTINENCE, MIXED: ICD-10-CM

## 2020-07-22 PROCEDURE — 99213 OFFICE O/P EST LOW 20 MIN: CPT | Mod: S$PBB,,, | Performed by: OBSTETRICS & GYNECOLOGY

## 2020-07-22 PROCEDURE — 88175 CYTOPATH C/V AUTO FLUID REDO: CPT

## 2020-07-22 PROCEDURE — 99999 PR PBB SHADOW E&M-EST. PATIENT-LVL III: ICD-10-PCS | Mod: PBBFAC,,, | Performed by: OBSTETRICS & GYNECOLOGY

## 2020-07-22 PROCEDURE — 87624 HPV HI-RISK TYP POOLED RSLT: CPT

## 2020-07-22 PROCEDURE — 99213 OFFICE O/P EST LOW 20 MIN: CPT | Mod: PBBFAC | Performed by: OBSTETRICS & GYNECOLOGY

## 2020-07-22 PROCEDURE — 99999 PR PBB SHADOW E&M-EST. PATIENT-LVL III: CPT | Mod: PBBFAC,,, | Performed by: OBSTETRICS & GYNECOLOGY

## 2020-07-22 PROCEDURE — 99213 PR OFFICE/OUTPT VISIT, EST, LEVL III, 20-29 MIN: ICD-10-PCS | Mod: S$PBB,,, | Performed by: OBSTETRICS & GYNECOLOGY

## 2020-07-22 NOTE — PROGRESS NOTES
Urogyn follow up  05/08/2019  .  [id number]   [address] [address]    Andria Green  [id number]  1955      Andria Green is a 65 y.o.  here for a urogyn follow up for urethral caruncle.  Last 7/2019.     Last HPI from 03/21/52019  1)  UI:  (+) JOSE J (rare) < (+) UUI (intermittent)  X 5years.  (+) pads:2/day, usually minimum wetness and no pad use at night.  Daytime frequency: Q 2 hours.  Nocturia: No:    (--) dysuria,  (--) hematuria,  (--) frequent UTIs.  (+) complete bladder emptying.       2)  POP:  Absent.  No consistent vaginal bleeding since using the Estring but has minor spotting x1 a month . (--) vaginal discharge. (--) sexually active.  (+) dyspareunia since the caruncle has been present. (--)  Vaginal dryness.  (+) vaginal estrogen use (Estring).  Did try to use estrace cream x awhile--didn't feel like it was helping. Last use was 1-2 months ago. Estrogen cream was expensive.       3)  BM:  (+) constipation/straining.  (+) chronic diarrhea described as up to 2 episodes a day. Irritable bowel--mostly loose. Sees Dr. Pierre in [place].  Has tried probiotic--only intermittently. Hasn't tried fiber. Did FODMAP diet--notes sweets trigger. Drinks lactaid mild.  (--) hematochezia.  (--) fecal incontinence.  (+) fecal smearing/urgency.  (+) complete evacuation.  Pt says at least 3-4x a month.     4)  Urethral caruncle:  Urethral caruncle was picked up by PCP, Dr. Lantigua during her yearly pelvic exam 6 years ago.  She was having no dysuria, bleeding or pain at the time it was found. Was sent to Urogynecologist Dr. Barry who did a bladder emptying study which was reported as normal.  Was instructed to use estrace for the caruncle.  Had been having dyspareunia with the urethral caruncle since then.  She was instructed at her most recent PCP visit on 2/1/2019 to be consulted by another urogynecologist.  Did try to use estrace cream x awhile--didn't feel like  it was helping.     --initial exam:  PELVIC:    External genitalia:  Normal Bartholins, Skenes and labia bilaterally.    Urethra:  + caruncle, red with point TTP, not circumferential; no diverticulum or masses.  (+) hypermobility.    Vagina:  Atrophy (+) , no bladder masses or tender, no discharge.  Estring in place.   Cervix:  normal appearance  Uterus: normal size, contour, position, consistency, mobility, non-tender  Adnexa: Not palpable.    Changes from last visit:  1) Urethral caruncle:  --no VB  --still painful, just with intecourse; NT otherwise  --using estrace to caruncle 3x/week + estring  --not sure that it is any smaller.     2) Mixed urinary incontinence, urge > stress:    --has improved; not using pads  --has had some new increased U/F, yassine 1st AM  --voiding every 2-3 hours  --denies nocturia     3)  H/o irritable bowel:  --no major issues  --taking bentyl daily and essential oils  --followed by Dr. Pierre in [place]    Past Medical History:   Diagnosis Date    Asthma     Hyperlipidemia     Hypertension     IBS (irritable bowel syndrome)        Past Surgical History:   Procedure Laterality Date    NASAL ENDOSCOPY W/ BALLON SINUPLASTY         Current Outpatient Medications   Medication Sig    albuterol (PROVENTIL/VENTOLIN HFA) 90 mcg/actuation inhaler     aspirin (ADULT LOW DOSE ASPIRIN) 81 MG EC tablet     atorvastatin (LIPITOR) 40 MG tablet     benzonatate (TESSALON) 200 MG capsule     BYSTOLIC 5 mg Tab     cefPROZIL (CEFZIL) 250 MG tablet     cetirizine (ZYRTEC) 10 MG tablet     cholecalciferol, vitamin D3, (VITAMIN D3) 2,000 unit Cap     estradiol (ESTRACE) 0.01 % (0.1 mg/gram) vaginal cream dime-sized amount with finger just inside vagina, around opening/inner lips, and on red area at urethra every night.    estradiol (ESTRING) 2 mg (7.5 mcg /24 hour) vaginal ring     estradioL (ESTRING) 2 mg (7.5 mcg /24 hour) vaginal ring Place 2 mg vaginally every 3 (three) months.    ESTRING  "2 mg (7.5 mcg /24 hour) vaginal ring     fluticasone (FLONASE) 50 mcg/actuation nasal spray     omega 3-dha-epa-fish oil (FISH OIL) 360-1,200 mg CpDR     pantoprazole (PROTONIX) 40 MG tablet      No current facility-administered medications for this visit.        Well woman:  Pap test: Normal  History of abnormal paps: No.  History of STIs:  No  Mammogram: Date of last: February 2019.  Result: Normal.  Ordered, please schedule at [facility].   Colonoscopy: Date of last: 2014.  Result:  Normal.  Repeat due: every 5 years.  Due 2021 per GI.   DEXA:  Date of last: 7-8 years ago.  Result:  Normal.  Repeat due:  Now.       ROS:  As per HPI.      Exam  BP (!) 140/76 (BP Location: Left arm, Patient Position: Sitting, BP Method: Medium (Automatic))   Ht 5' 2" (1.575 m)   Wt 65.6 kg (144 lb 10 oz)   BMI 26.45 kg/m²   General: alert and oriented, no acute distress  Respiratory: normal respiratory effort  Abd: soft, non-tender, non-distended    PELVIC:    External genitalia:  Normal Bartholins, Skenes and labia bilaterally.    Urethra:  + caruncle, min TTP--much improved from last visit, not circumferential; no diverticulum or masses. Stable in size.  No firmness or TTP palpated along the length of the urethra.  (+) hypermobility.    Vagina:  Atrophy (+) , no bladder masses or tender, no discharge.  Estring in place.   Cervix:  normal appearance; pap/HPV done  Uterus: normal size, contour, position, consistency, mobility, non-tender  Adnexa: Not palpable.    Impression  1. Encounter for screening mammogram for breast cancer  Mammo Digital Screening Bilat With CAD    Mammo Digital Screening Bilat With CAD   2. Cervical cancer screening  Liquid-Based Pap Smear, Screening    HPV High Risk Genotypes, PCR   3. Urethral caruncle  Cystourethroscopy-Future   4. Vaginal atrophy     5. Urinary incontinence, mixed     6. Irritable bowel syndrome, unspecified type       We reviewed the above issues and discussed options for " short-term versus long-term management of her problems.   Plan:   1) Urethral caruncle:  --symptoms are minimal  --stable--CTM  --continue using estring  --at least 2-3 times a week continue topical estrace again: dime-sized amount with finger just inside vagina, around opening/inner lips, and on red area at urethra every night.  Make sure to put on cream after urinating, right before bed.  Have intercourse before using cream.  Ask for generic or use goodRx if too expensive. Consider compounded estrogen cream.    --cytoscopy in office to evaluate entire length; if mostly distal, consider excision (mainly due to symptoms)  --if continues to be refractory: would need biopsy, possible excision     2) Mixed urinary incontinence, urge > stress:    --see if treating caruncle helps  --Empty bladder every 3 hours.  Empty well: wait a minute, lean forward on toilet.    --Avoid dietary irritants (see sheet).  Keep diary x 3-5 days to determine your irritants.  --KEGELS: do 10 in AM and 10 in PM, holding each x 10 seconds.  When you feel urge to go, STOP, KEGEL, and when urge has passed, then go to bathroom.  Consider PT in future.    --URGE: consider medication in future.  Takes 2-4 weeks to see if will have effect.  For dry mouth: get sour, sugar free lozenge or gum.    --STRESS:  Pessary vs. Sling.      3)  H/o irritable bowel:  --controlling irregular bowel movements may help bladder symptoms  --follow up with Dr. Pierre in [place]  --continue bentyl and essential oils  --continue to avoid dietary irritants  --high fiber diet     4)  Well-woman:  Pap test: Normal.  Pap/HPV done today--if normal, can stop but need to see GYN annually.  History of abnormal paps: No.  History of STIs:  No  Mammogram: Date of last: February 2019.  Result: Normal.  Ordered, [facility] at [facility]. Given Rx.   Colonoscopy: Date of last: 2014.  Result:  Normal.  Repeat due: every 5 years.  Due 2021 per GI.   DEXA:  Date of last:  7-8 years ago.  Result:  Normal.  Repeat due:  Now. RTC 6 months.  If have more acute pain or bleeding, let us know sooner. If worsens, consider cystoscopy/possible Bx vs excision if symptomatic.     5)  RTC for cystoscopy.      30 minutes were spent in face to face time with this patient  90 % of this time was spent in counseling and/or coordination of care  ME@  [facility]  Division of Female Pelvic Medicine and Reconstructive Surgery  Department of Obstetrics & Gynecology

## 2020-07-22 NOTE — PATIENT INSTRUCTIONS
1) Urethral caruncle:  --symptoms are minimal  --stable--CTM  --continue using estring  --at least 2-3 times a week continue topical estrace again: dime-sized amount with finger just inside vagina, around opening/inner lips, and on red area at urethra every night.  Make sure to put on cream after urinating, right before bed.  Have intercourse before using cream.  Ask for generic or use goodRx if too expensive. Consider compounded estrogen cream.    --cytoscopy in office to evaluate entire length; if mostly distal, consider excision (mainly due to symptoms)  --if continues to be refractory: would need biopsy, possible excision     2) Mixed urinary incontinence, urge > stress:    --see if treating caruncle helps  --Empty bladder every 3 hours.  Empty well: wait a minute, lean forward on toilet.    --Avoid dietary irritants (see sheet).  Keep diary x 3-5 days to determine your irritants.  --KEGELS: do 10 in AM and 10 in PM, holding each x 10 seconds.  When you feel urge to go, STOP, KEGEL, and when urge has passed, then go to bathroom.  Consider PT in future.    --URGE: consider medication in future.  Takes 2-4 weeks to see if will have effect.  For dry mouth: get sour, sugar free lozenge or gum.    --STRESS:  Pessary vs. Sling.      3)  H/o irritable bowel:  --controlling irregular bowel movements may help bladder symptoms  --follow up with Dr. Pierre in [place]  --continue bentyl and essential oils  --continue to avoid dietary irritants  --high fiber diet     4)  Well-woman:  Pap test: Normal.  Pap/HPV done today--if normal, can stop but need to see GYN annually.  History of abnormal paps: No.  History of STIs:  No  Mammogram: Date of last: February 2019.  Result: Normal.  Ordered, please schedule at [facility]. Given Rx.   Colonoscopy: Date of last: 2014.  Result:  Normal.  Repeat due: every 5 years.  Due 2021 per GI.   DEXA:  Date of last: 7-8 years ago.  Result:  Normal.  Repeat due:  Now. RTC 6 months.   If have more acute pain or bleeding, let us know sooner. If worsens, consider cystoscopy/possible Bx vs excision if symptomatic.     5)  RTC for cystoscopy.

## 2020-07-30 LAB
HPV HR 12 DNA SPEC QL NAA+PROBE: NEGATIVE
HPV16 AG SPEC QL: NEGATIVE
HPV18 DNA SPEC QL NAA+PROBE: NEGATIVE

## 2020-08-05 LAB
FINAL PATHOLOGIC DIAGNOSIS: NORMAL
Lab: NORMAL

## 2020-09-02 ENCOUNTER — TELEPHONE (OUTPATIENT)
Dept: UROGYNECOLOGY | Facility: CLINIC | Age: 65
End: 2020-09-02

## 2020-09-02 NOTE — PROGRESS NOTES
Title of Operation:   Cystourethroscopy.     INDICATIONS:  Last HPI from 03/21/52019  1)  UI:  (+) JOSE J (rare) < (+) UUI (intermittent)  X 5years.  (+) pads:2/day, usually minimum wetness and no pad use at night.  Daytime frequency: Q 2 hours.  Nocturia: No:    (--) dysuria,  (--) hematuria,  (--) frequent UTIs.  (+) complete bladder emptying.       2)  POP:  Absent.  No consistent vaginal bleeding since using the Estring but has minor spotting x1 a month . (--) vaginal discharge. (--) sexually active.  (+) dyspareunia since the caruncle has been present. (--)  Vaginal dryness.  (+) vaginal estrogen use (Estring).  Did try to use estrace cream x awhile--didn't feel like it was helping. Last use was 1-2 months ago. Estrogen cream was expensive.       3)  BM:  (+) constipation/straining.  (+) chronic diarrhea described as up to 2 episodes a day. Irritable bowel--mostly loose. Sees Dr. Pierre in [place].  Has tried probiotic--only intermittently. Hasn't tried fiber. Did FODMAP diet--notes sweets trigger. Drinks lactaid mild.  (--) hematochezia.  (--) fecal incontinence.  (+) fecal smearing/urgency.  (+) complete evacuation.  Pt says at least 3-4x a month.     4)  Urethral caruncle:  Urethral caruncle was picked up by PCP, Dr. Lantigua during her yearly pelvic exam 6 years ago.  She was having no dysuria, bleeding or pain at the time it was found. Was sent to Urogynecologist Dr. Barry who did a bladder emptying study which was reported as normal.  Was instructed to use estrace for the caruncle.  Had been having dyspareunia with the urethral caruncle since then.  She was instructed at her most recent PCP visit on 2/1/2019 to be consulted by another urogynecologist.  Did try to use estrace cream x awhile--didn't feel like it was helping.      --initial exam:  PELVIC:    External genitalia:  Normal Bartholins, Skenes and labia bilaterally.    Urethra:  + caruncle, red with point TTP, not circumferential; no diverticulum  or masses.  (+) hypermobility.    Vagina:  Atrophy (+) , no bladder masses or tender, no discharge.  Estring in place.   Cervix:  normal appearance  Uterus: normal size, contour, position, consistency, mobility, non-tender  Adnexa: Not palpable.     Changes from last visit:  1) Urethral caruncle:  --no VB  --still painful, just with intecourse; NT otherwise  --using estrace to caruncle 3x/week + estring  --not sure that it is any smaller.     2) Mixed urinary incontinence, urge > stress:    --has improved; not using pads  --has had some new increased U/F, yassine 1st AM  --voiding every 2-3 hours  --denies nocturia     3)  H/o irritable bowel:  --no major issues  --taking bentyl daily and essential oils  --followed by Dr. Pierre in [place]    PREOPERATIVE DIAGNOSIS  Urethral caruncle    Vaginal atrophy    Urinary incontinence, mixed    Irritable bowel syndrome, unspecified type      POSTOPERATIVE DIAGNOSIS:   Urethral caruncle    Vaginal atrophy    Urinary incontinence, mixed    Irritable bowel syndrome, unspecified type      Anesthesia:   2% Xylocaine gel.    Specimen (Bacteriological, Pathological or other):   None.     Prosthetic Device/Implant:   None.     Surgeons Narrative:     After informed consent was obtained, the patient was placed in the lithotomy position. The urethral meatus was prepped with Betadine and 10 cubic centimeters of 2% Xylocaine gel were introduced into the urethra. A flexible cystourethroscope was introduced into the bladder. The bladder was distended with approximately 300 cubic centimeters of sterile water. A systematic survey was performed in which the bladder was surveyed using multiple sequential passes in a clockwise fashion from the bladder dome to the bladder base to the urethrovesical junction. The trigone and ureteral orifices were observed. The scope was then flipped back on itself, and the urethrovesical junction was viewed. A vaginal examining finger was then placed with  pressure suburethrally at the urethrovesical junction as the telescope was withdrawn in order to perform positive pressure urethroscopy.  Standard maneuvers of cough, squeeze and Valsalva were performed. The telescope was then completely withdrawn.     Findings: Urethroscopy:  Normal.  Caruncle isolated to very distal posterior urethra.   Cystoscopy:  Normal bladder mucosa, bilateral ureteral flow was noted.     Assessment: Essentially normal cystourethroscopy with distal, isolated urethra.     Plan:     1) Urethral caruncle:  --symptoms are minimal  --stable--CTM  --continue using estring  --at least 2-3 times a week continue topical estrace again: dime-sized amount with finger just inside vagina, around opening/inner lips, and on red area at urethra every night.  Make sure to put on cream after urinating, right before bed.  Have intercourse before using cream.  Ask for generic or use goodRx if too expensive. Consider compounded estrogen cream.    --cystourethroscopy with very distal, isolated caruncle: consider excision if symptoms too bothersome  --if symptoms continue to be refractory: would need biopsy, possible excision     2) Mixed urinary incontinence, urge > stress:    --continue treating caruncle  --Empty bladder every 3 hours.  Empty well: wait a minute, lean forward on toilet.    --Avoid dietary irritants (see sheet).  Keep diary x 3-5 days to determine your irritants.  --KEGELS: do 10 in AM and 10 in PM, holding each x 10 seconds.  When you feel urge to go, STOP, KEGEL, and when urge has passed, then go to bathroom.  Consider PT in future.    --URGE: consider medication in future.  Takes 2-4 weeks to see if will have effect.  For dry mouth: get sour, sugar free lozenge or gum.    --STRESS:  Pessary vs. Sling.      3)  H/o irritable bowel:  --controlling irregular bowel movements may help bladder symptoms  --follow up with Dr. Pierre in [place]  --continue bentyl and essential oils  --continue to  avoid dietary irritants  --high fiber diet     4)  Well-woman:  Pap test: 2020 pap normal/HR HPV neg; no further paps needed; see GYN annually.   Mammogram: Date of last: February 2019.  Result: Normal.  Ordered, please schedule at [place]. Given Rx.   Colonoscopy: Date of last: 2014.  Result:  Normal.  Repeat due: every 5 years.  Due 2021 per GI.   DEXA:  Date of last: 7-8 years ago.  Result:  Normal.  Repeat due:  Now. RTC 6 months.  If have more acute pain or bleeding, let us know sooner. If worsens, consider cystoscopy/possible Bx vs excision if symptomatic.      5)  RTC 3-4 months. If caruncle stable, symptoms minimal, follow up Q6 months-1 year.

## 2020-09-03 ENCOUNTER — OFFICE VISIT (OUTPATIENT)
Dept: UROGYNECOLOGY | Facility: CLINIC | Age: 65
End: 2020-09-03
Payer: MEDICARE

## 2020-09-03 VITALS
HEIGHT: 62 IN | DIASTOLIC BLOOD PRESSURE: 79 MMHG | BODY MASS INDEX: 27.1 KG/M2 | WEIGHT: 147.25 LBS | SYSTOLIC BLOOD PRESSURE: 164 MMHG

## 2020-09-03 DIAGNOSIS — N36.2 URETHRAL CARUNCLE: ICD-10-CM

## 2020-09-03 DIAGNOSIS — N95.2 VAGINAL ATROPHY: Primary | ICD-10-CM

## 2020-09-03 DIAGNOSIS — N39.46 URINARY INCONTINENCE, MIXED: ICD-10-CM

## 2020-09-03 DIAGNOSIS — K58.9 IRRITABLE BOWEL SYNDROME, UNSPECIFIED TYPE: ICD-10-CM

## 2020-09-03 LAB
BILIRUB SERPL-MCNC: NORMAL MG/DL
BLOOD URINE, POC: NORMAL
CLARITY, POC UA: CLEAR
COLOR, POC UA: YELLOW
GLUCOSE UR QL STRIP: NORMAL
KETONES UR QL STRIP: NORMAL
LEUKOCYTE ESTERASE URINE, POC: NORMAL
NITRITE, POC UA: NORMAL
PH, POC UA: 5
PROTEIN, POC: NORMAL
SPECIFIC GRAVITY, POC UA: 1.01
UROBILINOGEN, POC UA: NORMAL

## 2020-09-03 PROCEDURE — 52000 CYSTOURETHROSCOPY: CPT | Mod: S$PBB,,, | Performed by: OBSTETRICS & GYNECOLOGY

## 2020-09-03 PROCEDURE — 99499 NO LOS: ICD-10-PCS | Mod: S$PBB,,, | Performed by: OBSTETRICS & GYNECOLOGY

## 2020-09-03 PROCEDURE — 99214 OFFICE O/P EST MOD 30 MIN: CPT | Mod: PBBFAC | Performed by: OBSTETRICS & GYNECOLOGY

## 2020-09-03 PROCEDURE — 52000 PR CYSTOURETHROSCOPY: ICD-10-PCS | Mod: S$PBB,,, | Performed by: OBSTETRICS & GYNECOLOGY

## 2020-09-03 PROCEDURE — 52000 CYSTOURETHROSCOPY: CPT | Mod: PBBFAC | Performed by: OBSTETRICS & GYNECOLOGY

## 2020-09-03 PROCEDURE — 99499 UNLISTED E&M SERVICE: CPT | Mod: S$PBB,,, | Performed by: OBSTETRICS & GYNECOLOGY

## 2020-09-03 PROCEDURE — 99999 PR PBB SHADOW E&M-EST. PATIENT-LVL IV: CPT | Mod: PBBFAC,,, | Performed by: OBSTETRICS & GYNECOLOGY

## 2020-09-03 PROCEDURE — 99999 PR PBB SHADOW E&M-EST. PATIENT-LVL IV: ICD-10-PCS | Mod: PBBFAC,,, | Performed by: OBSTETRICS & GYNECOLOGY

## 2020-09-03 PROCEDURE — 81002 URINALYSIS NONAUTO W/O SCOPE: CPT | Mod: PBBFAC | Performed by: OBSTETRICS & GYNECOLOGY

## 2020-09-03 RX ORDER — CIPROFLOXACIN 500 MG/1
500 TABLET ORAL
Status: COMPLETED | OUTPATIENT
Start: 2020-09-03 | End: 2020-09-03

## 2020-09-03 RX ORDER — LIDOCAINE HYDROCHLORIDE 20 MG/ML
JELLY TOPICAL ONCE
Status: COMPLETED | OUTPATIENT
Start: 2020-09-03 | End: 2020-09-03

## 2020-09-03 RX ADMIN — CIPROFLOXACIN 500 MG: 500 TABLET ORAL at 09:09

## 2020-09-03 RX ADMIN — LIDOCAINE HYDROCHLORIDE 5 ML: 20 JELLY TOPICAL at 09:09

## 2021-05-10 ENCOUNTER — PATIENT MESSAGE (OUTPATIENT)
Dept: RESEARCH | Facility: HOSPITAL | Age: 66
End: 2021-05-10

## 2021-07-22 ENCOUNTER — PATIENT MESSAGE (OUTPATIENT)
Dept: UROGYNECOLOGY | Facility: CLINIC | Age: 66
End: 2021-07-22

## 2021-07-22 DIAGNOSIS — N95.2 VAGINAL ATROPHY: Primary | ICD-10-CM

## 2021-07-22 RX ORDER — ESTRADIOL 2 MG/1
2 SYSTEM VAGINAL ONCE
Qty: 1 EACH | Refills: 0 | Status: SHIPPED | OUTPATIENT
Start: 2021-07-22 | End: 2021-07-22